# Patient Record
Sex: FEMALE | Race: WHITE | NOT HISPANIC OR LATINO | Employment: FULL TIME | URBAN - METROPOLITAN AREA
[De-identification: names, ages, dates, MRNs, and addresses within clinical notes are randomized per-mention and may not be internally consistent; named-entity substitution may affect disease eponyms.]

---

## 2023-02-21 LAB — CHORIOGONADOTROPIN (MIU/ML) IN SER/PLAS: 9 IU/L

## 2023-03-02 LAB — CHORIOGONADOTROPIN (MIU/ML) IN SER/PLAS: 3 IU/L

## 2023-10-16 ENCOUNTER — TELEPHONE (OUTPATIENT)
Dept: OBSTETRICS AND GYNECOLOGY | Facility: CLINIC | Age: 23
End: 2023-10-16
Payer: COMMERCIAL

## 2023-10-16 NOTE — TELEPHONE ENCOUNTER
Patient called wanting to know what she should do.  Her periods are fluctuating every month and she's testing negative for pregnancy.  She is concerned on what she should do because the last time she had an ectopic pregnancy.  She was told that [facility] doesn't take pregnant patients and that she can make an appt with a doctor, but she wanted to speak to [facility].

## 2023-10-20 ENCOUNTER — APPOINTMENT (OUTPATIENT)
Dept: OBSTETRICS AND GYNECOLOGY | Facility: CLINIC | Age: 23
End: 2023-10-20
Payer: COMMERCIAL

## 2023-11-08 PROBLEM — O20.9 BLEEDING IN EARLY PREGNANCY (HHS-HCC): Status: ACTIVE | Noted: 2023-11-08

## 2023-11-08 PROBLEM — N92.6 IRREGULAR BLEEDING: Status: ACTIVE | Noted: 2023-11-08

## 2023-11-08 PROBLEM — O00.109 TUBAL ECTOPIC PREGNANCY (HHS-HCC): Status: ACTIVE | Noted: 2023-11-08

## 2023-11-08 RX ORDER — VENLAFAXINE 75 MG/1
1 TABLET ORAL DAILY
COMMUNITY
End: 2023-11-09 | Stop reason: ALTCHOICE

## 2023-11-08 RX ORDER — FLUTICASONE PROPIONATE 50 MCG
SPRAY, SUSPENSION (ML) NASAL
COMMUNITY

## 2023-11-08 RX ORDER — ONDANSETRON 4 MG/1
TABLET, ORALLY DISINTEGRATING ORAL
COMMUNITY
Start: 2022-11-30 | End: 2023-11-09 | Stop reason: ALTCHOICE

## 2023-11-08 RX ORDER — VENLAFAXINE HYDROCHLORIDE 37.5 MG/1
37.5 CAPSULE, EXTENDED RELEASE ORAL
COMMUNITY
Start: 2023-04-20 | End: 2023-11-09 | Stop reason: ALTCHOICE

## 2023-11-09 ENCOUNTER — OFFICE VISIT (OUTPATIENT)
Dept: OBSTETRICS AND GYNECOLOGY | Facility: CLINIC | Age: 23
End: 2023-11-09
Payer: COMMERCIAL

## 2023-11-09 VITALS
SYSTOLIC BLOOD PRESSURE: 110 MMHG | DIASTOLIC BLOOD PRESSURE: 70 MMHG | BODY MASS INDEX: 24.41 KG/M2 | WEIGHT: 143 LBS | HEIGHT: 64 IN

## 2023-11-09 DIAGNOSIS — Z01.419 ENCOUNTER FOR WELL WOMAN EXAM WITH ROUTINE GYNECOLOGICAL EXAM: Primary | ICD-10-CM

## 2023-11-09 PROCEDURE — 99395 PREV VISIT EST AGE 18-39: CPT | Performed by: NURSE PRACTITIONER

## 2023-11-09 PROCEDURE — 1036F TOBACCO NON-USER: CPT | Performed by: NURSE PRACTITIONER

## 2023-11-09 NOTE — PROGRESS NOTES
"     HPI:   Kimber Blackmon is a 23 y.o. who presents today for her annual gynecologic exam without complaints   (hx of ectopic)    She has the following concerns; None.     Hx of ectopic ~last year (23) that was treated with methotrexate. She denies any further concerns. States she has \"not been trying, but not preventing\" pregnancy. She continues to take her prenatal vitamin.   She recently weaned off her Effexor. Continues to have some anxiety; but overall coping okay with it. She will monitor this moving forward.     GYN HISTORY:  Periods are regular every 28-30 days, lasting 4 days.   Dysmenorrhea:none. Cyclic symptoms include anxiety and moodiness.   No intermenstrual bleeding, spotting, or discharge.    Current contraception: none      Requests STD testing: no     PAP History   Last pap:    Normal HPV Not done    Health Screening  Family history of breast, uterine, ovarian or colon cancer: no         The patient feels safe at home.         Review of Systems:   Constitutional: no fever and no chills.  Cardiovascular: no chest pain.   Respiratory: no shortness of breath.   Gastrointestinal: no nausea, no abdominal pain and no constipation  Genitourinary: no dysuria, no urinary incontinence, no vaginal dryness, no pelvic pain and no vaginal discharge.   Neurological: no headache.  Psychiatric: +  anxiety and no depression.              Objective         /70   Ht 1.625 m (5' 3.98\")   Wt 64.9 kg (143 lb)   LMP 2023   BMI 24.56 kg/m²         Physical Exam:   Constitutional: Alert and in no acute distress. Well developed, well nourished.      Neck: No neck asymmetry. Supple. Thyroid not enlarged and there were no palpable thyroid nodules.      Cardiovascular: Heart rate and rhythm were normal, normal S1 and S2, no gallops, and no murmurs.      Pulmonary: No respiratory distress. Clear bilateral breath sounds.      Chest: Breasts: Normal appearance, no nipple discharge and no skin changes. " Palpation of breasts and axillae: No palpable mass and no axillary lymphadenopathy.      Abdomen: Soft nontender; no abdominal mass palpated. Normal bowel sounds. No organomegaly.      Genitourinary:   - External genitalia: Normal.   - Palpation of lymph nodes in groin: No inguinal lymphadenopathy.   - Bartholin's Urethral and Skenes Glands: Normal.   - Urethra: Normal.    -Bladder: Normal on palpation.   - Vagina: Normal.   - Cervix: Normal.   - Uterus: Normal. Right Adnexa/parametria: Normal. Left Adnexa/parametria: Normal.   - Perianal Area: Normal.      Skin: Normal skin color and pigmentation, normal skin turgor, and no rash     Psychiatric: Alert and oriented x 3. Affect normal to patient baseline. Mood: Appropriate.            Assessment/Plan       Diagnoses and all orders for this visit:  Encounter for well woman exam with routine gynecological exam  Kimber is a [facility] patient who presents for well woman exam. She is doing well since her ectopic pregnancy last January (2023). She is not using any form of birth control at this time. Feels more bothersome symptoms with periods (moodiness, breast tenderness) since stopping her ocp, but feels she is managing her periods well.   She is up to date with her PAP's.   Continue prenatal vitamin.   Follow-up in one year; sooner if needed, or with a positive pregnancy test.         KIKA Mirza-CNP

## 2024-11-14 ENCOUNTER — APPOINTMENT (OUTPATIENT)
Dept: OBSTETRICS AND GYNECOLOGY | Facility: CLINIC | Age: 24
End: 2024-11-14
Payer: COMMERCIAL

## 2024-11-14 VITALS
HEIGHT: 64 IN | WEIGHT: 135 LBS | DIASTOLIC BLOOD PRESSURE: 60 MMHG | BODY MASS INDEX: 23.05 KG/M2 | SYSTOLIC BLOOD PRESSURE: 100 MMHG

## 2024-11-14 DIAGNOSIS — Z01.419 ENCOUNTER FOR WELL WOMAN EXAM WITH ROUTINE GYNECOLOGICAL EXAM: Primary | ICD-10-CM

## 2024-11-14 PROCEDURE — 3008F BODY MASS INDEX DOCD: CPT | Performed by: NURSE PRACTITIONER

## 2024-11-14 PROCEDURE — 1036F TOBACCO NON-USER: CPT | Performed by: NURSE PRACTITIONER

## 2024-11-14 PROCEDURE — 99395 PREV VISIT EST AGE 18-39: CPT | Performed by: NURSE PRACTITIONER

## 2024-11-14 RX ORDER — SERTRALINE HYDROCHLORIDE 50 MG/1
1 TABLET, FILM COATED ORAL
COMMUNITY
Start: 2024-11-05

## 2024-11-14 NOTE — PROGRESS NOTES
"     HPI:   Kimber Blackmon is a 24 y.o. who presents today for her annual gynecologic exam without complaints   (hx of ectopic)    She has the following concerns;   Here for well woman exam. She is doing well. No concerns.      Hx of ectopic ~last year (23) that was treated with methotrexate. States she has \"not been trying, but not preventing\" pregnancy. She continues to take her prenatal vitamin. She is tracking her cycles on her phone, she was using ovulation predictor tests. She is timing intercourse.     Recently started Sertraline. Feels this is helping with her symptoms.     GYN HISTORY:  Periods are regular every 28-30 days, lasting 5-7 days.   Dysmenorrhea:none. Cyclic symptoms include anxiety and moodiness.   No intermenstrual bleeding, spotting, or discharge.    Current contraception: none      Requests STD testing: no     PAP History   Last pap:    Normal HPV Not done  She got the HPV vaccine when she was younger.     Health Screening  Family history of breast, uterine, ovarian or colon cancer: no         The patient feels safe at home.         Review of Systems:   Constitutional: no fever and no chills.  Cardiovascular: no chest pain.   Respiratory: no shortness of breath.   Gastrointestinal: no nausea, no abdominal pain and no constipation  Genitourinary: no dysuria, no urinary incontinence, no vaginal dryness, no pelvic pain and no vaginal discharge.   Neurological: no headache.  Psychiatric: +  anxiety and no depression.              Objective         /60   Ht 1.62 m (5' 3.78\")   Wt 61.2 kg (135 lb)   LMP 2024   BMI 23.33 kg/m²         Physical Exam:   Constitutional: Alert and in no acute distress. Well developed, well nourished.      Neck: No neck asymmetry. Supple. Thyroid not enlarged and there were no palpable thyroid nodules.      Cardiovascular: Heart rate and rhythm were normal, normal S1 and S2, no gallops, and no murmurs.      Pulmonary: No respiratory distress. " Clear bilateral breath sounds.      Chest: Breasts: Normal appearance, no nipple discharge and no skin changes. Palpation of breasts and axillae: No palpable mass and no axillary lymphadenopathy.      Abdomen: Soft nontender; no abdominal mass palpated. Normal bowel sounds. No organomegaly.      Genitourinary:   - External genitalia: Normal.   - Palpation of lymph nodes in groin: No inguinal lymphadenopathy.   - Bartholin's Urethral and Skenes Glands: Normal.   - Urethra: Normal.    -Bladder: Normal on palpation.   - Vagina: Normal.   - Cervix: Normal.   - Uterus: Normal. Right Adnexa/parametria: Normal. Left Adnexa/parametria: Normal.   - Perianal Area: Normal.      Skin: Normal skin color and pigmentation, normal skin turgor, and no rash     Psychiatric: Alert and oriented x 3. Affect normal to patient baseline. Mood: Appropriate.            Assessment/Plan       Diagnoses and all orders for this visit:  Encounter for well woman exam with routine gynecological exam  Kimber is a [facility] patient who presents for well woman exam. She is doing well since her ectopic pregnancy last January (2023). She is not using any form of birth control at this time. Feels she is managing her periods well being off birth control. Her PAP is up to date, she is due next year for her PAP. She will continue prenatal vitamin.   Follow-up in one year; sooner if needed, or with a positive pregnancy test.         KIKA Mirza-CNP

## 2025-11-20 ENCOUNTER — APPOINTMENT (OUTPATIENT)
Dept: OBSTETRICS AND GYNECOLOGY | Facility: CLINIC | Age: 25
End: 2025-11-20
Payer: COMMERCIAL